# Patient Record
Sex: FEMALE | URBAN - METROPOLITAN AREA
[De-identification: names, ages, dates, MRNs, and addresses within clinical notes are randomized per-mention and may not be internally consistent; named-entity substitution may affect disease eponyms.]

---

## 2022-03-28 ENCOUNTER — LAB OUTSIDE AN ENCOUNTER (OUTPATIENT)
Dept: URBAN - METROPOLITAN AREA CLINIC 128 | Facility: CLINIC | Age: 42
End: 2022-03-28

## 2022-03-28 ENCOUNTER — DASHBOARD ENCOUNTERS (OUTPATIENT)
Age: 42
End: 2022-03-28

## 2022-03-28 ENCOUNTER — OFFICE VISIT (OUTPATIENT)
Dept: URBAN - METROPOLITAN AREA CLINIC 128 | Facility: CLINIC | Age: 42
End: 2022-03-28
Payer: COMMERCIAL

## 2022-03-28 VITALS
SYSTOLIC BLOOD PRESSURE: 98 MMHG | WEIGHT: 133 LBS | HEART RATE: 59 BPM | TEMPERATURE: 97.4 F | DIASTOLIC BLOOD PRESSURE: 64 MMHG | BODY MASS INDEX: 24.48 KG/M2 | HEIGHT: 62 IN

## 2022-03-28 DIAGNOSIS — R11.0 NAUSEA: ICD-10-CM

## 2022-03-28 DIAGNOSIS — R10.13 EPIGASTRIC ABDOMINAL PAIN: ICD-10-CM

## 2022-03-28 DIAGNOSIS — R63.4 WEIGHT LOSS: ICD-10-CM

## 2022-03-28 PROBLEM — 422587007: Status: ACTIVE | Noted: 2022-03-28

## 2022-03-28 PROBLEM — 89362005: Status: ACTIVE | Noted: 2022-03-28

## 2022-03-28 PROCEDURE — 99203 OFFICE O/P NEW LOW 30 MIN: CPT | Performed by: STUDENT IN AN ORGANIZED HEALTH CARE EDUCATION/TRAINING PROGRAM

## 2022-03-28 NOTE — HPI-TODAY'S VISIT:
The pt is a 42 yo F who presents for severe abdoimanl pain.  Took pepto-bismol  but did not improve, and actually caused pyrosis.  Negative ct 2 weeks ago.  Epigastric pain, non-radiating, has woken up from sleep with discomfort.  Nausea and only eats when she is super hungry.  No particular trigger foods that she can find.  No melena or hematochezia.  3 lb wt loss in a month.  Has been on a PPI bid but she does not recall the name.  Has her gallbladder but the ct did not show stones.    Has a h/o similar issues about a year ago, with severe weight loss, and underwent testing that was negative.   Had labs and imaging pre PCP.  No prior endoscopies.  The pt and her spouse were under the impression that they were to get an EGD today and were disappointed that she was not to undergo the procedure today.  No known FH of GI luminal malignancies.

## 2022-04-05 PROBLEM — 79922009: Status: ACTIVE | Noted: 2022-03-28

## 2022-04-22 ENCOUNTER — OFFICE VISIT (OUTPATIENT)
Dept: URBAN - METROPOLITAN AREA SURGERY CENTER 31 | Facility: SURGERY CENTER | Age: 42
End: 2022-04-22